# Patient Record
Sex: FEMALE | Race: WHITE | NOT HISPANIC OR LATINO | Employment: FULL TIME | URBAN - NONMETROPOLITAN AREA
[De-identification: names, ages, dates, MRNs, and addresses within clinical notes are randomized per-mention and may not be internally consistent; named-entity substitution may affect disease eponyms.]

---

## 2023-03-17 LAB
AMPHETAMINE (PRESENCE) IN URINE BY SCREEN METHOD: ABNORMAL
BARBITURATES PRESENCE IN URINE BY SCREEN METHOD: ABNORMAL
BENZODIAZEPINE (PRESENCE) IN URINE BY SCREEN METHOD: ABNORMAL
CANNABINOIDS IN URINE BY SCREEN METHOD: ABNORMAL
COCAINE (PRESENCE) IN URINE BY SCREEN METHOD: ABNORMAL
DRUG SCREEN COMMENT URINE: ABNORMAL
FENTANYL URINE: ABNORMAL
METHADONE (PRESENCE) IN URINE BY SCREEN METHOD: ABNORMAL
OPIATES (PRESENCE) IN URINE BY SCREEN METHOD: ABNORMAL
OXYCODONE (PRESENCE) IN URINE BY SCREEN METHOD: ABNORMAL
PHENCYCLIDINE (PRESENCE) IN URINE BY SCREEN METHOD: ABNORMAL

## 2023-03-24 LAB
AMPHETAMINES,URINE: <50 NG/ML
MDA,URINE: <200 NG/ML
MDEA,URINE: <200 NG/ML
MDMA,UR: <200 NG/ML
METHAMPHETAMINE QUANTITATIVE URINE: <200 NG/ML
PHENTERMINE,UR: >5000 NG/ML

## 2023-04-24 ENCOUNTER — HOSPITAL ENCOUNTER (OUTPATIENT)
Dept: DATA CONVERSION | Facility: HOSPITAL | Age: 48
End: 2023-04-24
Attending: ANESTHESIOLOGY | Admitting: ANESTHESIOLOGY
Payer: COMMERCIAL

## 2023-04-24 DIAGNOSIS — M54.16 RADICULOPATHY, LUMBAR REGION: ICD-10-CM

## 2023-04-24 DIAGNOSIS — M47.27 OTHER SPONDYLOSIS WITH RADICULOPATHY, LUMBOSACRAL REGION: ICD-10-CM

## 2023-04-24 DIAGNOSIS — M54.17 RADICULOPATHY, LUMBOSACRAL REGION: ICD-10-CM

## 2023-09-07 VITALS — BODY MASS INDEX: 34.76 KG/M2 | WEIGHT: 172.4 LBS | HEIGHT: 59 IN

## 2023-10-02 NOTE — OP NOTE
Post Operative Note:     PreOp Diagnosis: Lumbosacral radiculopathy   Post-Procedure Diagnosis: Same   Procedure: 1.   2.  Right L4 and L5 transforaminal IRA with fluoroscopy  3.   4.   5.   Surgeon: Dr. Ley   Resident/Fellow/Other Assistant: None   Anesthesia: Local   Estimated Blood Loss (mL): none   Specimen: no   Findings: None     Operative Report Dictated:  Dictation: not applicable - note contains Operative  Report   Operative Report:    47-year-old  female here today for a right L4 and L5 transforaminal IRA with fluoroscopy.  She has a documented bulging disc with nerve root impingement  which has not responded to conservative therapy.  Risk and benefits of today's procedure as well as COVID-19 was discussed with her and a signed consent was obtained prior to entry into the OR as well as surgical site was marked with the surgical marker.  Operation performed:  #1  Right L4 and L5 transforaminal epidural steroid injection with fluoroscopic guidance.   #2 epidurography  ANESTHESIA: Local  ESTIMATED BLOOD LOSS: None  COMPLICATIONS: None  PROCEDURE: A 22-gauge IV was started in the patient's left hand. The patient was taken to the operating room, placed in the prone position where sterile prep and drapes were done. Supplemental oxygen was given to the patient at 2 L per minute. Blood pressure  and pulse ox remained stable throughout the case. Using fluoroscopic guidance, a single 25-gauge 3-1/2 inch spinal needle was inserted into the neural foramen at L4 and L5 on the right.  A cross table lateral identified the needle tip at the vertebral  body. Aspiration was negative of cerebrospinal fluid or blood.  One mL of Omnipaque 300 mg contrast media was injected into each spinal needle. The L4 and L5 nerve roots and epidural space was visualized in both AP and each lateral views with the fluoroscope.  One mL of 2% xylocaine MPF and 6 mg of Celestone was injected into each of the 2 needles. The  needles were then removed and sterile bandage was applied along with Neosporin ointment to the puncture sites. The patient was taken to the recovery room with  no neurologic deficits or pain noted. The patient is scheduled for a follow-up visit.      Electronic Signatures:  Bassam Ley ()  (Signed 24-Apr-2023 12:28)   Authored: Post Operative Note, Note Completion      Last Updated: 24-Apr-2023 12:28 by Bassam Ley ()

## 2024-03-01 ENCOUNTER — APPOINTMENT (OUTPATIENT)
Dept: PRIMARY CARE | Facility: CLINIC | Age: 49
End: 2024-03-01
Payer: COMMERCIAL

## 2024-04-03 ENCOUNTER — APPOINTMENT (OUTPATIENT)
Dept: PRIMARY CARE | Facility: CLINIC | Age: 49
End: 2024-04-03
Payer: COMMERCIAL

## 2024-08-23 ENCOUNTER — APPOINTMENT (OUTPATIENT)
Dept: PRIMARY CARE | Facility: CLINIC | Age: 49
End: 2024-08-23
Payer: COMMERCIAL

## 2024-10-08 ENCOUNTER — OFFICE VISIT (OUTPATIENT)
Dept: URGENT CARE | Facility: URGENT CARE | Age: 49
End: 2024-10-08
Payer: COMMERCIAL

## 2024-10-08 VITALS
WEIGHT: 194 LBS | SYSTOLIC BLOOD PRESSURE: 123 MMHG | OXYGEN SATURATION: 99 % | RESPIRATION RATE: 16 BRPM | BODY MASS INDEX: 39.11 KG/M2 | HEIGHT: 59 IN | DIASTOLIC BLOOD PRESSURE: 85 MMHG | HEART RATE: 73 BPM | TEMPERATURE: 98 F

## 2024-10-08 DIAGNOSIS — R19.7 DIARRHEA, UNSPECIFIED TYPE: ICD-10-CM

## 2024-10-08 DIAGNOSIS — R50.9 FEVER, UNSPECIFIED FEVER CAUSE: Primary | ICD-10-CM

## 2024-10-08 DIAGNOSIS — J06.9 VIRAL URI: ICD-10-CM

## 2024-10-08 DIAGNOSIS — J02.9 SORE THROAT: ICD-10-CM

## 2024-10-08 LAB
POC RAPID INFLUENZA A: NEGATIVE
POC RAPID INFLUENZA B: NEGATIVE
POC RAPID STREP: NEGATIVE
POC SARS-COV-2 AG BINAX: NORMAL
S PYO DNA THROAT QL NAA+PROBE: NOT DETECTED

## 2024-10-08 RX ORDER — BROMPHENIRAMINE MALEATE, PSEUDOEPHEDRINE HYDROCHLORIDE, AND DEXTROMETHORPHAN HYDROBROMIDE 2; 30; 10 MG/5ML; MG/5ML; MG/5ML
5 SYRUP ORAL 4 TIMES DAILY PRN
Qty: 120 ML | Refills: 0 | Status: SHIPPED | OUTPATIENT
Start: 2024-10-08 | End: 2024-10-18

## 2024-10-08 ASSESSMENT — ENCOUNTER SYMPTOMS
FEVER: 0
RHINORRHEA: 1
HEADACHES: 1
CHILLS: 1
GASTROINTESTINAL NEGATIVE: 1
FACIAL SWELLING: 0
SHORTNESS OF BREATH: 0
COUGH: 1
CARDIOVASCULAR NEGATIVE: 1
MYALGIAS: 1

## 2024-10-08 NOTE — LETTER
October 8, 2024     Patient: Tali Moreno   YOB: 1975   Date of Visit: 10/8/2024       To Whom It May Concern:    Tali Moreno was seen in my clinic on 10/8/2024 at 8:00 am. Please excuse Tali for her absence from work on this day to make the appointment.    If you have any questions or concerns, please don't hesitate to call.         Sincerely,         Laura Henley PA-C        CC: No Recipients

## 2024-10-08 NOTE — PROGRESS NOTES
Subjective   Patient ID: Tali Moreno is a 49 y.o. female. They present today with a chief complaint of No chief complaint on file..    History of Present Illness  49-year-old female with no past medical history here with headache cough sore throat and myalgias no fevers positive chills has not tried any over-the-counter medications symptoms started last night    Eating and drinking well          Past Medical History  Allergies as of 10/08/2024    (Not on File)       (Not in a hospital admission)       Past Medical History:   Diagnosis Date    Personal history of other specified conditions 05/02/2022    History of dizziness       Past Surgical History:   Procedure Laterality Date    MR HEAD ANGIO WO IV CONTRAST  5/4/2022    MR HEAD ANGIO WO IV CONTRAST 5/4/2022 [facility]    OTHER SURGICAL HISTORY  05/17/2022    Cholecystectomy    OTHER SURGICAL HISTORY  05/17/2022    Breast surgery            Review of Systems  Review of Systems   Constitutional:  Positive for chills. Negative for fever.   HENT:  Positive for rhinorrhea. Negative for dental problem, drooling, ear discharge and facial swelling.    Respiratory:  Positive for cough. Negative for shortness of breath.    Cardiovascular: Negative.    Gastrointestinal: Negative.    Genitourinary: Negative.    Musculoskeletal:  Positive for myalgias.   Neurological:  Positive for headaches.   All other systems reviewed and are negative.                                 Objective    There were no vitals filed for this visit.  No LMP recorded.    Physical Exam  Vitals and nursing note reviewed.   Constitutional:       Appearance: Normal appearance.   HENT:      Head: Normocephalic and atraumatic.      Right Ear: Tympanic membrane, ear canal and external ear normal.      Left Ear: Tympanic membrane, ear canal and external ear normal.      Nose: Congestion present.      Mouth/Throat:      Mouth: Mucous membranes are moist.      Pharynx: No posterior oropharyngeal  erythema.      Comments: Positive postnasal drip posterior pharynx uvula midline no exudate no edema  Neck:      Vascular: No carotid bruit.      Comments: No cervical lymphadenopathy on exam  Full range of motion  Cardiovascular:      Rate and Rhythm: Regular rhythm. Tachycardia present.   Pulmonary:      Effort: Pulmonary effort is normal.      Breath sounds: Normal breath sounds.   Musculoskeletal:      Cervical back: Normal range of motion and neck supple. No rigidity or tenderness.   Neurological:      Mental Status: She is alert.         Procedures    Point of Care Test & Imaging Results from this visit  No results found for this visit on 10/08/24.   No results found.    Diagnostic study results (if any) were reviewed by Laura Henley PA-C.    Assessment/Plan   Allergies, medications, history, and pertinent labs/EKGs/Imaging reviewed by Laura Henley PA-C.     Medical Decision Making  Differential diagnosis includes strep, COVID, viral URI, seasonal allergies    Rapid strep---> NEGATIVE  Rapid COVID--->NEGATIVE  Rapid flu A & B ---> NEGATIVE  Will send strep PCR treat as viral    Patient instructed take over-the-counter Motrin or Tylenol for symptoms will also prescribe a cough and cold medication  Stressed that if symptoms get worse if they do not improve with the go on longer than 10 days she needs to get rechecked    Orders and Diagnoses  There are no diagnoses linked to this encounter.    Medical Admin Record      Patient disposition: Home    Electronically signed by Laura Henley PA-C  8:14 AM

## 2024-10-08 NOTE — PATIENT INSTRUCTIONS
Push fluids water juices  Take over-the-counter Motrin or Tylenol for the headaches and bodyaches  You are prescribed cough and cold medication to help with the symptoms  If your symptoms get progressively worse if they do not improve after 10 days or if any changes to get rechecked

## 2024-10-14 ENCOUNTER — APPOINTMENT (OUTPATIENT)
Dept: PRIMARY CARE | Facility: CLINIC | Age: 49
End: 2024-10-14
Payer: COMMERCIAL

## 2024-10-14 VITALS
DIASTOLIC BLOOD PRESSURE: 78 MMHG | SYSTOLIC BLOOD PRESSURE: 122 MMHG | WEIGHT: 194 LBS | OXYGEN SATURATION: 97 % | BODY MASS INDEX: 39.18 KG/M2 | HEART RATE: 76 BPM

## 2024-10-14 DIAGNOSIS — Z12.11 COLON CANCER SCREENING: ICD-10-CM

## 2024-10-14 DIAGNOSIS — Z12.31 ENCOUNTER FOR SCREENING MAMMOGRAM FOR MALIGNANT NEOPLASM OF BREAST: ICD-10-CM

## 2024-10-14 DIAGNOSIS — Q28.2 CEREBRAL AV MALFORMATION (HHS-HCC): ICD-10-CM

## 2024-10-14 DIAGNOSIS — F33.0 MILD EPISODE OF RECURRENT MAJOR DEPRESSIVE DISORDER (CMS-HCC): ICD-10-CM

## 2024-10-14 DIAGNOSIS — K21.9 GASTROESOPHAGEAL REFLUX DISEASE WITHOUT ESOPHAGITIS: ICD-10-CM

## 2024-10-14 DIAGNOSIS — R63.5 WEIGHT GAIN: ICD-10-CM

## 2024-10-14 DIAGNOSIS — E61.1 IRON DEFICIENCY: ICD-10-CM

## 2024-10-14 DIAGNOSIS — E55.9 AVITAMINOSIS D: ICD-10-CM

## 2024-10-14 DIAGNOSIS — J01.00 ACUTE NON-RECURRENT MAXILLARY SINUSITIS: ICD-10-CM

## 2024-10-14 DIAGNOSIS — Z00.00 ROUTINE GENERAL MEDICAL EXAMINATION AT A HEALTH CARE FACILITY: Primary | ICD-10-CM

## 2024-10-14 PROCEDURE — 99396 PREV VISIT EST AGE 40-64: CPT | Performed by: FAMILY MEDICINE

## 2024-10-14 PROCEDURE — 99214 OFFICE O/P EST MOD 30 MIN: CPT | Performed by: FAMILY MEDICINE

## 2024-10-14 RX ORDER — ESOMEPRAZOLE MAGNESIUM 40 MG/1
40 CAPSULE, DELAYED RELEASE ORAL
Qty: 90 CAPSULE | Refills: 3 | Status: SHIPPED | OUTPATIENT
Start: 2024-10-14 | End: 2025-10-14

## 2024-10-14 RX ORDER — AZITHROMYCIN 250 MG/1
TABLET, FILM COATED ORAL
Qty: 6 TABLET | Refills: 0 | Status: SHIPPED | OUTPATIENT
Start: 2024-10-14 | End: 2024-10-19

## 2024-10-14 RX ORDER — ESCITALOPRAM OXALATE 10 MG/1
10 TABLET ORAL DAILY
Qty: 90 TABLET | Refills: 3 | Status: SHIPPED | OUTPATIENT
Start: 2024-10-14 | End: 2025-10-14

## 2024-10-14 ASSESSMENT — PROMIS GLOBAL HEALTH SCALE
EMOTIONAL_PROBLEMS: SOMETIMES
RATE_AVERAGE_FATIGUE: MILD
RATE_AVERAGE_PAIN: 3
RATE_PHYSICAL_HEALTH: FAIR
RATE_QUALITY_OF_LIFE: FAIR
CARRYOUT_PHYSICAL_ACTIVITIES: MODERATELY
RATE_SOCIAL_SATISFACTION: FAIR
RATE_GENERAL_HEALTH: FAIR
CARRYOUT_SOCIAL_ACTIVITIES: FAIR
RATE_MENTAL_HEALTH: FAIR

## 2024-10-14 ASSESSMENT — PATIENT HEALTH QUESTIONNAIRE - PHQ9
2. FEELING DOWN, DEPRESSED OR HOPELESS: SEVERAL DAYS
SUM OF ALL RESPONSES TO PHQ9 QUESTIONS 1 AND 2: 2
1. LITTLE INTEREST OR PLEASURE IN DOING THINGS: SEVERAL DAYS
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
## 2024-10-21 ENCOUNTER — LAB (OUTPATIENT)
Dept: LAB | Facility: LAB | Age: 49
End: 2024-10-21
Payer: COMMERCIAL

## 2024-10-21 DIAGNOSIS — E55.9 AVITAMINOSIS D: ICD-10-CM

## 2024-10-21 DIAGNOSIS — E61.1 IRON DEFICIENCY: ICD-10-CM

## 2024-10-21 DIAGNOSIS — Z00.00 ROUTINE GENERAL MEDICAL EXAMINATION AT A HEALTH CARE FACILITY: ICD-10-CM

## 2024-10-21 DIAGNOSIS — R63.5 WEIGHT GAIN: ICD-10-CM

## 2024-10-21 LAB
ALBUMIN SERPL BCP-MCNC: 4.1 G/DL (ref 3.4–5)
ALP SERPL-CCNC: 66 U/L (ref 33–110)
ALT SERPL W P-5'-P-CCNC: 11 U/L (ref 7–45)
ANION GAP SERPL CALC-SCNC: 12 MMOL/L (ref 10–20)
AST SERPL W P-5'-P-CCNC: 17 U/L (ref 9–39)
BASOPHILS # BLD AUTO: 0.03 X10*3/UL (ref 0–0.1)
BASOPHILS NFR BLD AUTO: 0.4 %
BILIRUB SERPL-MCNC: 0.2 MG/DL (ref 0–1.2)
BUN SERPL-MCNC: 16 MG/DL (ref 6–23)
CALCIUM SERPL-MCNC: 8.7 MG/DL (ref 8.6–10.3)
CHLORIDE SERPL-SCNC: 101 MMOL/L (ref 98–107)
CHOLEST SERPL-MCNC: 168 MG/DL (ref 0–199)
CHOLESTEROL/HDL RATIO: 3.7
CO2 SERPL-SCNC: 28 MMOL/L (ref 21–32)
CREAT SERPL-MCNC: 0.79 MG/DL (ref 0.5–1.05)
EGFRCR SERPLBLD CKD-EPI 2021: >90 ML/MIN/1.73M*2
EOSINOPHIL # BLD AUTO: 0.3 X10*3/UL (ref 0–0.7)
EOSINOPHIL NFR BLD AUTO: 3.6 %
ERYTHROCYTE [DISTWIDTH] IN BLOOD BY AUTOMATED COUNT: 13.2 % (ref 11.5–14.5)
GLUCOSE SERPL-MCNC: 104 MG/DL (ref 74–99)
HCT VFR BLD AUTO: 36.4 % (ref 36–46)
HDLC SERPL-MCNC: 45.1 MG/DL
HGB BLD-MCNC: 11.5 G/DL (ref 12–16)
IMM GRANULOCYTES # BLD AUTO: 0.02 X10*3/UL (ref 0–0.7)
IMM GRANULOCYTES NFR BLD AUTO: 0.2 % (ref 0–0.9)
IRON SATN MFR SERPL: 9 % (ref 25–45)
IRON SERPL-MCNC: 33 UG/DL (ref 35–150)
LDLC SERPL CALC-MCNC: 83 MG/DL
LYMPHOCYTES # BLD AUTO: 1.83 X10*3/UL (ref 1.2–4.8)
LYMPHOCYTES NFR BLD AUTO: 22.1 %
MCH RBC QN AUTO: 29.2 PG (ref 26–34)
MCHC RBC AUTO-ENTMCNC: 31.6 G/DL (ref 32–36)
MCV RBC AUTO: 92 FL (ref 80–100)
MONOCYTES # BLD AUTO: 0.62 X10*3/UL (ref 0.1–1)
MONOCYTES NFR BLD AUTO: 7.5 %
NEUTROPHILS # BLD AUTO: 5.49 X10*3/UL (ref 1.2–7.7)
NEUTROPHILS NFR BLD AUTO: 66.2 %
NON HDL CHOLESTEROL: 123 MG/DL (ref 0–149)
NRBC BLD-RTO: 0 /100 WBCS (ref 0–0)
PLATELET # BLD AUTO: 368 X10*3/UL (ref 150–450)
POTASSIUM SERPL-SCNC: 4.1 MMOL/L (ref 3.5–5.3)
PROT SERPL-MCNC: 6.6 G/DL (ref 6.4–8.2)
RBC # BLD AUTO: 3.94 X10*6/UL (ref 4–5.2)
SODIUM SERPL-SCNC: 137 MMOL/L (ref 136–145)
TIBC SERPL-MCNC: 380 UG/DL (ref 240–445)
TRIGL SERPL-MCNC: 199 MG/DL (ref 0–149)
TSH SERPL-ACNC: 3.18 MIU/L (ref 0.44–3.98)
UIBC SERPL-MCNC: 347 UG/DL (ref 110–370)
VLDL: 40 MG/DL (ref 0–40)
WBC # BLD AUTO: 8.3 X10*3/UL (ref 4.4–11.3)

## 2024-10-21 PROCEDURE — 83540 ASSAY OF IRON: CPT

## 2024-10-21 PROCEDURE — 85025 COMPLETE CBC W/AUTO DIFF WBC: CPT

## 2024-10-21 PROCEDURE — 36415 COLL VENOUS BLD VENIPUNCTURE: CPT

## 2024-10-21 PROCEDURE — 82306 VITAMIN D 25 HYDROXY: CPT

## 2024-10-21 PROCEDURE — 83036 HEMOGLOBIN GLYCOSYLATED A1C: CPT

## 2024-10-21 PROCEDURE — 80061 LIPID PANEL: CPT

## 2024-10-21 PROCEDURE — 83550 IRON BINDING TEST: CPT

## 2024-10-21 PROCEDURE — 84443 ASSAY THYROID STIM HORMONE: CPT

## 2024-10-21 PROCEDURE — 80053 COMPREHEN METABOLIC PANEL: CPT

## 2024-10-22 ENCOUNTER — HOSPITAL ENCOUNTER (OUTPATIENT)
Dept: RADIOLOGY | Facility: HOSPITAL | Age: 49
Discharge: HOME | End: 2024-10-22
Payer: COMMERCIAL

## 2024-10-22 VITALS — WEIGHT: 190 LBS | HEIGHT: 59 IN | BODY MASS INDEX: 38.3 KG/M2

## 2024-10-22 DIAGNOSIS — Z12.31 ENCOUNTER FOR SCREENING MAMMOGRAM FOR MALIGNANT NEOPLASM OF BREAST: ICD-10-CM

## 2024-10-22 LAB
25(OH)D3 SERPL-MCNC: 35 NG/ML (ref 30–100)
EST. AVERAGE GLUCOSE BLD GHB EST-MCNC: 103 MG/DL
HBA1C MFR BLD: 5.2 %

## 2024-10-22 PROCEDURE — 77067 SCR MAMMO BI INCL CAD: CPT | Performed by: RADIOLOGY

## 2024-10-22 PROCEDURE — 77067 SCR MAMMO BI INCL CAD: CPT

## 2024-10-22 PROCEDURE — 77063 BREAST TOMOSYNTHESIS BI: CPT | Performed by: RADIOLOGY

## 2024-10-24 DIAGNOSIS — E61.1 IRON DEFICIENCY: Primary | ICD-10-CM

## 2024-10-24 RX ORDER — FERROUS SULFATE 325(65) MG
325 TABLET, DELAYED RELEASE (ENTERIC COATED) ORAL DAILY
Qty: 90 TABLET | Refills: 3 | Status: SHIPPED | OUTPATIENT
Start: 2024-10-24 | End: 2025-10-24

## 2024-11-07 LAB — NONINV COLON CA DNA+OCC BLD SCRN STL QL: NEGATIVE

## 2024-11-12 ENCOUNTER — APPOINTMENT (OUTPATIENT)
Dept: RADIOLOGY | Facility: HOSPITAL | Age: 49
End: 2024-11-12
Payer: COMMERCIAL

## 2024-11-12 ENCOUNTER — HOSPITAL ENCOUNTER (OUTPATIENT)
Dept: RADIOLOGY | Facility: HOSPITAL | Age: 49
Discharge: HOME | End: 2024-11-12
Payer: COMMERCIAL

## 2024-11-12 DIAGNOSIS — R92.8 ABNORMAL MAMMOGRAM: ICD-10-CM

## 2024-11-12 PROCEDURE — 77065 DX MAMMO INCL CAD UNI: CPT | Mod: LEFT SIDE | Performed by: RADIOLOGY

## 2024-11-12 PROCEDURE — 77061 BREAST TOMOSYNTHESIS UNI: CPT | Mod: LEFT SIDE | Performed by: RADIOLOGY

## 2024-11-12 PROCEDURE — 77061 BREAST TOMOSYNTHESIS UNI: CPT | Mod: LT

## 2024-11-19 ENCOUNTER — TELEPHONE (OUTPATIENT)
Dept: PRIMARY CARE | Facility: CLINIC | Age: 49
End: 2024-11-19
Payer: COMMERCIAL

## 2024-12-16 ENCOUNTER — APPOINTMENT (OUTPATIENT)
Dept: PRIMARY CARE | Facility: CLINIC | Age: 49
End: 2024-12-16
Payer: COMMERCIAL

## 2025-04-28 ENCOUNTER — APPOINTMENT (OUTPATIENT)
Dept: PRIMARY CARE | Facility: CLINIC | Age: 50
End: 2025-04-28
Payer: COMMERCIAL

## 2025-04-29 ENCOUNTER — APPOINTMENT (OUTPATIENT)
Dept: PRIMARY CARE | Facility: CLINIC | Age: 50
End: 2025-04-29
Payer: COMMERCIAL

## 2025-04-29 VITALS
SYSTOLIC BLOOD PRESSURE: 118 MMHG | HEART RATE: 74 BPM | DIASTOLIC BLOOD PRESSURE: 74 MMHG | BODY MASS INDEX: 39.79 KG/M2 | OXYGEN SATURATION: 98 % | WEIGHT: 197 LBS

## 2025-04-29 DIAGNOSIS — Q28.3 CEREBRAL CAVERNOMA (HHS-HCC): ICD-10-CM

## 2025-04-29 DIAGNOSIS — Z79.899 HIGH RISK MEDICATION USE: ICD-10-CM

## 2025-04-29 DIAGNOSIS — R63.5 WEIGHT GAIN: Primary | ICD-10-CM

## 2025-04-29 DIAGNOSIS — E66.09 CLASS 2 OBESITY DUE TO EXCESS CALORIES WITHOUT SERIOUS COMORBIDITY WITH BODY MASS INDEX (BMI) OF 39.0 TO 39.9 IN ADULT: ICD-10-CM

## 2025-04-29 DIAGNOSIS — E66.812 CLASS 2 OBESITY DUE TO EXCESS CALORIES WITHOUT SERIOUS COMORBIDITY WITH BODY MASS INDEX (BMI) OF 39.0 TO 39.9 IN ADULT: ICD-10-CM

## 2025-04-29 DIAGNOSIS — Q28.2 CEREBRAL AV MALFORMATION (HHS-HCC): ICD-10-CM

## 2025-04-29 DIAGNOSIS — E61.1 IRON DEFICIENCY: ICD-10-CM

## 2025-04-29 PROCEDURE — 99214 OFFICE O/P EST MOD 30 MIN: CPT | Performed by: FAMILY MEDICINE

## 2025-04-29 RX ORDER — PHENTERMINE HYDROCHLORIDE 37.5 MG/1
37.5 TABLET ORAL
Qty: 30 TABLET | Refills: 0 | Status: SHIPPED | OUTPATIENT
Start: 2025-04-29 | End: 2025-05-29

## 2025-04-29 ASSESSMENT — PATIENT HEALTH QUESTIONNAIRE - PHQ9
SUM OF ALL RESPONSES TO PHQ9 QUESTIONS 1 AND 2: 0
1. LITTLE INTEREST OR PLEASURE IN DOING THINGS: NOT AT ALL
2. FEELING DOWN, DEPRESSED OR HOPELESS: NOT AT ALL

## 2025-04-29 NOTE — PROGRESS NOTES
Subjective   Tali Moreno is a 49 y.o. female who presents for Menopause (Patient's weight keeps increasing - not sure if going through menopause).    HPI    #) anxiety/depression - had taken medications in the past    stable, good as when taking the medications  - stopped the lexapro, does not think she needs it.  - energy is good.      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   - worse with walking or standing      #) left CMC joint swelling and pain -- likely ganglion cysts -- did see ortho hand   - woke one AM and was there   - started in July   - xray neg for fx      #) Obesity - stable   - BMI 38.7--> 32.72---> 37.8 --> 39.8  --- wt today was 174 --> 194 --> 197   - tried phentermine in the past and lost 20# `but made her have headaches and dry mouth - she is willing to try again due to cost  - tried semaglutide samples for 5 weeks and did not seem to help   - she would like to try the Ozempic, we discussed that that is covered for T2DM   -  does report that she snores  - is walking a lot, on a treadmill , one hour per day   - is trying to cut out sweets, mostly salads with chicken and fish   - no nocturia   - chronic fatigue   - father had a MI at 51, passed, he was a smoker and drinker     #) Varicose veins - working on treatment   - will refer to vascular - did the consultation, 4 procedures are scheduled - better   - cramping legs - better      #) GERD with abdominal pain - stable , taking over the counter nexium   - CT abd showed small fat containing ventral hernia  - will refer to general surgery once feeling better      #) Preventive:  Smoker: 1 cig per week, not interested in quitting   Etoh: 6 drinks per week   BP: 118/74  Fam h/o: pancreatic and lung cancer   PAP: 1/27/17, Dr Hair  Mammogram: needs, have been abnormal in the past at [facility]; order given last visit   Colonoscopy: never    DEXA: n/a  Fasting blood work:10/14/17   Last eye exam: 2017  Last dental Exam: needs   2 children      ROS was completed and all systems are negative with the exception of what was noted in the the HPI.     Objective     /74   Pulse 74   Wt 89.4 kg (197 lb)   SpO2 98%   BMI 39.79 kg/m²      Physical Exam  GEN: A+O, no acute distress  HEENT: NC/AT, Oropharynx clear, no exudates, TM visualized, Extraoccular muscles intact, no facial droop; no thyromegaly or cervical LAD  RESP: CTAB, no wheezes   CV: RRR, no murmurs  ABD: soft, non-tender, + BS  SKIN: no rashes or bruising, no peripheral edema   NEURO: CN II-XII grossly intact, moves all extremities equally, no tremor   PSYCH: normal affect, appropriate mood     Assessment/Plan   Problem List Items Addressed This Visit    None  Please get updated fasting blood work     Start the phentermine in the AM   We discussed side effects  Start with half and then increase to a full tablet as tolerated   Make sure to drink around 60 oz of water per day  Shoot for about 100 grams of protein per day     Work on some weighted exercise.     Blood pressure is good today at 118/74     Glad you mood has been better     Continue to work on good sleep.     Mammogram was good on 10/22/24, with additional imaging. Repeat in October 2025.     Cologuard was good in October 2024. Due again in October 2027    Follow up in roughly 1 month for weight check and refills or sooner as needed     Shirley Lemus DO, MSMed, ABOM  [address]   [address]   [place] [address]  Ph. [phone]  Fx. [phone]

## 2025-04-29 NOTE — PATIENT INSTRUCTIONS
Please get updated fasting blood work     Start the phentermine in the AM   We discussed side effects  Start with half and then increase to a full tablet as tolerated   Make sure to drink around 60 oz of water per day  Shoot for about 100 grams of protein per day     Work on some weighted exercise.     Blood pressure is good today at 118/74     Glad you mood has been better     Continue to work on good sleep.     Mammogram was good on 10/22/24, with additional imaging. Repeat in October 2025.     Cologuard was good in October 2024. Due again in October 2027    Follow up in roughly 1 month for weight check and refills or sooner as needed

## 2025-04-30 LAB
AMPHETAMINES UR QL: NEGATIVE NG/ML
BARBITURATES UR QL: NEGATIVE NG/ML
BENZODIAZ UR QL: NEGATIVE NG/ML
BZE UR QL: NEGATIVE NG/ML
CREAT UR-MCNC: 126.4 MG/DL
EST. AVERAGE GLUCOSE BLD GHB EST-MCNC: 108 MG/DL
EST. AVERAGE GLUCOSE BLD GHB EST-SCNC: 6 MMOL/L
FENTANYL UR QL SCN: NEGATIVE NG/ML
FSH SERPL-ACNC: 30.1 MIU/ML
HBA1C MFR BLD: 5.4 %
IRON SATN MFR SERPL: 10 % (CALC) (ref 16–45)
IRON SERPL-MCNC: 42 MCG/DL (ref 40–190)
LH SERPL-ACNC: 17.5 MIU/ML
METHADONE UR QL: NEGATIVE NG/ML
OPIATES UR QL: NEGATIVE NG/ML
OXIDANTS UR QL: NEGATIVE MCG/ML
OXYCODONE UR QL: NEGATIVE NG/ML
PCP UR QL: NEGATIVE NG/ML
PH UR: 6.6 [PH] (ref 4.5–9)
QUEST NOTES AND COMMENTS: NORMAL
THC UR QL: NEGATIVE NG/ML
TIBC SERPL-MCNC: 403 MCG/DL (CALC) (ref 250–450)
TSH SERPL-ACNC: 3.56 MIU/L

## 2025-06-02 ENCOUNTER — APPOINTMENT (OUTPATIENT)
Dept: PRIMARY CARE | Facility: CLINIC | Age: 50
End: 2025-06-02
Payer: COMMERCIAL

## 2025-06-02 VITALS
SYSTOLIC BLOOD PRESSURE: 122 MMHG | WEIGHT: 192 LBS | OXYGEN SATURATION: 92 % | BODY MASS INDEX: 38.78 KG/M2 | HEART RATE: 78 BPM | DIASTOLIC BLOOD PRESSURE: 74 MMHG

## 2025-06-02 DIAGNOSIS — E66.09 CLASS 2 OBESITY DUE TO EXCESS CALORIES WITHOUT SERIOUS COMORBIDITY WITH BODY MASS INDEX (BMI) OF 39.0 TO 39.9 IN ADULT: ICD-10-CM

## 2025-06-02 DIAGNOSIS — E66.812 CLASS 2 OBESITY DUE TO EXCESS CALORIES WITHOUT SERIOUS COMORBIDITY WITH BODY MASS INDEX (BMI) OF 39.0 TO 39.9 IN ADULT: ICD-10-CM

## 2025-06-02 DIAGNOSIS — R51.9 DAILY HEADACHE: Primary | ICD-10-CM

## 2025-06-02 PROCEDURE — 99214 OFFICE O/P EST MOD 30 MIN: CPT | Performed by: FAMILY MEDICINE

## 2025-06-02 RX ORDER — PHENTERMINE HYDROCHLORIDE 37.5 MG/1
37.5 TABLET ORAL
Qty: 30 TABLET | Refills: 0 | Status: SHIPPED | OUTPATIENT
Start: 2025-06-02 | End: 2025-07-02

## 2025-06-02 RX ORDER — TOPIRAMATE 50 MG/1
50 TABLET, FILM COATED ORAL NIGHTLY
Qty: 90 TABLET | Refills: 0 | Status: SHIPPED | OUTPATIENT
Start: 2025-06-02 | End: 2025-11-29

## 2025-06-02 ASSESSMENT — PATIENT HEALTH QUESTIONNAIRE - PHQ9
2. FEELING DOWN, DEPRESSED OR HOPELESS: NOT AT ALL
1. LITTLE INTEREST OR PLEASURE IN DOING THINGS: NOT AT ALL
SUM OF ALL RESPONSES TO PHQ9 QUESTIONS 1 AND 2: 0

## 2025-06-02 NOTE — PROGRESS NOTES
Subjective   Tali Moreno is a 49 y.o. female who presents for Follow-up (One month follow up).    HPI    #) anxiety/depression - had taken medications in the past - stable    stable, good as when taking the medications  - stopped the lexapro, does not think she needs it.  - energy is good.      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   - worse with walking or standing      #) left CMC joint swelling and pain -- likely ganglion cysts -- did see ortho hand   - woke one AM and was there   - started in July   - xray neg for fx      #) Obesity - stable   - BMI 38.7--> 32.72---> 37.8 --> 39.8 --> 38.8   --- wt today was 174 --> 194 --> 197 (started the phentermine)--> 192   - down 5#   - tried phentermine in the past and lost 20# `but made her have headaches and dry mouth - she is willing to try again due to cost  - tried semaglutide samples for 5 weeks and did not seem to help   - she would like to try the Ozempic, we discussed that that is covered for T2DM   -  does report that she snores  - is walking a lot, on a treadmill , one hour per day   - is trying to cut out sweets, mostly salads with chicken and fish   - no nocturia   - chronic fatigue -- better   - father had a MI at 51, passed, he was a smoker and drinker     #) Varicose veins - working on treatment   - will refer to vascular - did the consultation, 4 procedures are scheduled - better   - cramping legs - better      #) GERD with abdominal pain - stable , taking over the counter nexium   - CT abd showed small fat containing ventral hernia  - will refer to general surgery once feeling better      #) Preventive:  Smoker: 1 cig per week, not interested in quitting   Etoh: 6 drinks per week   Fam h/o: pancreatic and lung cancer   PAP: 1/27/17, Dr Hair  Mammogram: needs, have been abnormal in the past at [facility]; order given last visit    Colonoscopy: never   DEXA: n/a  Fasting blood work:10/14/17   Last eye exam: 2017  Last dental Exam: needs   2 children      ROS was completed and all systems are negative with the exception of what was noted in the the HPI.     Objective     /74   Pulse 78   Wt 87.1 kg (192 lb)   SpO2 92%   BMI 38.78 kg/m²      Physical Exam  GEN: A+O, no acute distress  HEENT: NC/AT, Oropharynx clear, no exudates, TM visualized, Extraoccular muscles intact, no facial droop; no thyromegaly or cervical LAD  RESP: CTAB, no wheezes   CV: RRR, no murmurs  ABD: soft, non-tender, + BS  SKIN: no rashes or bruising, no peripheral edema   NEURO: CN II-XII grossly intact, moves all extremities equally, no tremor   PSYCH: normal affect, appropriate mood     Assessment/Plan   Problem List Items Addressed This Visit    None  Labs on 4/29/25- Iron is on the low side, please try to increase iron in the diet and maybe take an OTC iron supplement for a month. A1c is good at 5.4% . Hormones would correlate to being postmenopausal, however in perimenopause these hormones can fluctuate . Normal thyroid level.     continue the phentermine in the AM   You care down 5# already   Add on the topamax 50 mg at bedtime.   We discussed side effects    Make sure to drink around 60 oz of water per day  Shoot for about 100 grams of protein per day     Work on some weighted exercise.     Blood pressure is good today at 122/74     Glad you mood has been better     Continue to work on good sleep.     Mammogram was good on 10/22/24, with additional imaging. Repeat in October 2025.     Cologuard was good in October 2024. Due again in October 2027    Follow up in roughly 1 month for weight check and refills or sooner as needed     Shirley Lemus, DO, MSMed, ABOM  [address]   [address]   [place] [address]  Ph. [phone]  Fx. [phone]

## 2025-06-02 NOTE — PATIENT INSTRUCTIONS
Labs on 4/29/25- Iron is on the low side, please try to increase iron in the diet and maybe take an OTC iron supplement for a month. A1c is good at 5.4% . Hormones would correlate to being postmenopausal, however in perimenopause these hormones can fluctuate . Normal thyroid level.     continue the phentermine in the AM   You care down 5# already   Add on the topamax 50 mg at bedtime.   We discussed side effects    Make sure to drink around 60 oz of water per day  Shoot for about 100 grams of protein per day     Work on some weighted exercise.     Blood pressure is good today at 122/74     Glad you mood has been better     Continue to work on good sleep.     Mammogram was good on 10/22/24, with additional imaging. Repeat in October 2025.     Cologuard was good in October 2024. Due again in October 2027    Follow up in roughly 1 month for weight check and refills or sooner as needed

## 2025-07-01 ENCOUNTER — APPOINTMENT (OUTPATIENT)
Dept: PRIMARY CARE | Facility: CLINIC | Age: 50
End: 2025-07-01
Payer: COMMERCIAL

## 2025-07-07 ENCOUNTER — APPOINTMENT (OUTPATIENT)
Dept: PRIMARY CARE | Facility: CLINIC | Age: 50
End: 2025-07-07
Payer: COMMERCIAL

## 2025-07-07 VITALS
SYSTOLIC BLOOD PRESSURE: 122 MMHG | WEIGHT: 189 LBS | OXYGEN SATURATION: 96 % | BODY MASS INDEX: 38.17 KG/M2 | HEART RATE: 72 BPM | DIASTOLIC BLOOD PRESSURE: 70 MMHG

## 2025-07-07 DIAGNOSIS — B35.3 TINEA PEDIS OF LEFT FOOT: Primary | ICD-10-CM

## 2025-07-07 DIAGNOSIS — E66.09 CLASS 2 OBESITY DUE TO EXCESS CALORIES WITHOUT SERIOUS COMORBIDITY WITH BODY MASS INDEX (BMI) OF 39.0 TO 39.9 IN ADULT: ICD-10-CM

## 2025-07-07 DIAGNOSIS — F33.0 MILD EPISODE OF RECURRENT MAJOR DEPRESSIVE DISORDER: ICD-10-CM

## 2025-07-07 DIAGNOSIS — E61.1 IRON DEFICIENCY: ICD-10-CM

## 2025-07-07 DIAGNOSIS — E66.812 CLASS 2 OBESITY DUE TO EXCESS CALORIES WITHOUT SERIOUS COMORBIDITY WITH BODY MASS INDEX (BMI) OF 39.0 TO 39.9 IN ADULT: ICD-10-CM

## 2025-07-07 DIAGNOSIS — K21.9 GASTROESOPHAGEAL REFLUX DISEASE WITHOUT ESOPHAGITIS: ICD-10-CM

## 2025-07-07 DIAGNOSIS — R51.9 DAILY HEADACHE: ICD-10-CM

## 2025-07-07 PROCEDURE — 99214 OFFICE O/P EST MOD 30 MIN: CPT | Performed by: FAMILY MEDICINE

## 2025-07-07 RX ORDER — ESCITALOPRAM OXALATE 10 MG/1
10 TABLET ORAL DAILY
Qty: 90 TABLET | Refills: 3 | Status: SHIPPED | OUTPATIENT
Start: 2025-07-07 | End: 2026-07-07

## 2025-07-07 RX ORDER — FERROUS SULFATE 325(65) MG
325 TABLET, DELAYED RELEASE (ENTERIC COATED) ORAL DAILY
Qty: 90 TABLET | Refills: 3 | Status: SHIPPED | OUTPATIENT
Start: 2025-07-07 | End: 2026-07-07

## 2025-07-07 RX ORDER — ESOMEPRAZOLE MAGNESIUM 40 MG/1
40 CAPSULE, DELAYED RELEASE ORAL
Qty: 90 CAPSULE | Refills: 3 | Status: SHIPPED | OUTPATIENT
Start: 2025-07-07 | End: 2026-07-07

## 2025-07-07 RX ORDER — PHENTERMINE HYDROCHLORIDE 37.5 MG/1
37.5 TABLET ORAL
Qty: 30 TABLET | Refills: 0 | Status: SHIPPED | OUTPATIENT
Start: 2025-07-07 | End: 2025-08-06

## 2025-07-07 RX ORDER — TOPIRAMATE 50 MG/1
50 TABLET, FILM COATED ORAL NIGHTLY
Qty: 90 TABLET | Refills: 3 | Status: SHIPPED | OUTPATIENT
Start: 2025-07-07 | End: 2026-07-02

## 2025-07-07 RX ORDER — CLOTRIMAZOLE AND BETAMETHASONE DIPROPIONATE 10; .64 MG/G; MG/G
1 CREAM TOPICAL 2 TIMES DAILY
Qty: 45 G | Refills: 0 | Status: SHIPPED | OUTPATIENT
Start: 2025-07-07 | End: 2025-08-04

## 2025-07-07 ASSESSMENT — PATIENT HEALTH QUESTIONNAIRE - PHQ9
1. LITTLE INTEREST OR PLEASURE IN DOING THINGS: NOT AT ALL
2. FEELING DOWN, DEPRESSED OR HOPELESS: NOT AT ALL
SUM OF ALL RESPONSES TO PHQ9 QUESTIONS 1 AND 2: 0

## 2025-07-07 NOTE — PROGRESS NOTES
Subjective   Tali Moreno is a 49 y.o. female who presents for Follow-up (One month follow up).    HPI    #) anxiety/depression - had taken medications in the past - stable    stable, good as when taking the medications  - stopped the lexapro, does not think she needs it.  - energy is good.      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   - worse with walking or standing      #) left CMC joint swelling and pain -- likely ganglion cysts -- did see ortho hand   - woke one AM and was there   - started in July   - xray neg for fx      #) Obesity - stable   - BMI 38.7--> 32.72---> 37.8 --> 39.8 --> 38.8 --> 38.17   --- wt today was 174 --> 194 --> 197 (started the phentermine)--> 192  --> 189   - down 8# in 60 days   - tried phentermine in the past and lost 20# `but made her have headaches and dry mouth - she is willing to try again due to cost  - no insomnia or constipation   - tried semaglutide samples for 5 weeks and did not seem to help   - she would like to try the Ozempic, we discussed that that is covered for T2DM   -  does report that she snores  - is walking a lot, on a treadmill , one hour per day   - is trying to cut out sweets, mostly salads with chicken and fish   - no nocturia   - chronic fatigue -- better   - father had a MI at 51, passed, he was a smoker and drinker     #) Varicose veins - working on treatment   - will refer to vascular - did the consultation, 4 procedures are scheduled - better   - cramping legs - better      #) GERD with abdominal pain - stable , taking over the counter nexium   - CT abd showed small fat containing ventral hernia  - will refer to general surgery once feeling better      #) Preventive:  Smoker: 1 cig per week, not interested in quitting   Etoh: 6 drinks per week   Fam h/o: pancreatic and lung cancer   PAP: 1/27/17, Dr Hair  Mammogram: needs, have been  abnormal in the past at [facility]; order given last visit   Colonoscopy: never   DEXA: n/a  Fasting blood work:10/14/17   Last eye exam: 2017  Last dental Exam: needs   2 children      ROS was completed and all systems are negative with the exception of what was noted in the the HPI.     Objective     /70   Pulse 72   Wt 85.7 kg (189 lb)   SpO2 96%   BMI 38.17 kg/m²      Physical Exam  GEN: A+O, no acute distress  HEENT: NC/AT, Oropharynx clear, no exudates, TM visualized, Extraoccular muscles intact, no facial droop; no thyromegaly or cervical LAD  RESP: CTAB, no wheezes   CV: RRR, no murmurs  ABD: soft, non-tender, + BS  SKIN: no rashes or bruising, no peripheral edema   NEURO: CN II-XII grossly intact, moves all extremities equally, no tremor   PSYCH: normal affect, appropriate mood     Assessment/Plan   Problem List Items Addressed This Visit    None    Please try the Lotrisone cream for the left foot rash     Labs on 4/29/25- Iron is on the low side, please try to increase iron in the diet and maybe take an OTC iron supplement for a month. A1c is good at 5.4% . Hormones would correlate to being postmenopausal, however in perimenopause these hormones can fluctuate . Normal thyroid level.     continue the phentermine in the AM   You care down 8# in 2 months   With the topamax 50 mg at bedtime.     Make sure to drink around 60 oz of water per day  Shoot for about 100 grams of protein per day     Work on some weighted exercise.     Blood pressure is good today at 122/70     Glad you mood has been better     Continue to work on good sleep.     Mammogram was good on 10/22/24, with additional imaging. Repeat in October 2025.     Cologuard was good in October 2024. Due again in October 2027    Follow up in roughly 1 month for weight check and refills or sooner as needed     Shirley Lemus, DO, MSMed, ABOM  [address]   [address]   [place] [address]  Ph. [phone]  Fx. [phone]

## 2025-07-07 NOTE — PATIENT INSTRUCTIONS
Please try the Lotrisone cream for the left foot rash     Labs on 4/29/25- Iron is on the low side, please try to increase iron in the diet and maybe take an OTC iron supplement for a month. A1c is good at 5.4% . Hormones would correlate to being postmenopausal, however in perimenopause these hormones can fluctuate . Normal thyroid level.     continue the phentermine in the AM   You care down 8# in 2 months   With the topamax 50 mg at bedtime.     Make sure to drink around 60 oz of water per day  Shoot for about 100 grams of protein per day     Work on some weighted exercise.     Blood pressure is good today at 122/70     Glad you mood has been better     Continue to work on good sleep.     Mammogram was good on 10/22/24, with additional imaging. Repeat in October 2025.     Cologuard was good in October 2024. Due again in October 2027    Follow up in roughly 1 month for weight check and refills or sooner as needed

## 2025-08-04 ENCOUNTER — APPOINTMENT (OUTPATIENT)
Dept: PRIMARY CARE | Facility: CLINIC | Age: 50
End: 2025-08-04
Payer: COMMERCIAL

## 2025-08-04 VITALS
DIASTOLIC BLOOD PRESSURE: 70 MMHG | BODY MASS INDEX: 38.17 KG/M2 | SYSTOLIC BLOOD PRESSURE: 124 MMHG | HEART RATE: 76 BPM | OXYGEN SATURATION: 97 % | WEIGHT: 189 LBS

## 2025-08-04 DIAGNOSIS — E66.09 CLASS 2 OBESITY DUE TO EXCESS CALORIES WITHOUT SERIOUS COMORBIDITY WITH BODY MASS INDEX (BMI) OF 39.0 TO 39.9 IN ADULT: ICD-10-CM

## 2025-08-04 DIAGNOSIS — B35.3 TINEA PEDIS OF LEFT FOOT: ICD-10-CM

## 2025-08-04 DIAGNOSIS — E66.812 CLASS 2 OBESITY DUE TO EXCESS CALORIES WITHOUT SERIOUS COMORBIDITY WITH BODY MASS INDEX (BMI) OF 39.0 TO 39.9 IN ADULT: ICD-10-CM

## 2025-08-04 PROCEDURE — 99213 OFFICE O/P EST LOW 20 MIN: CPT | Performed by: FAMILY MEDICINE

## 2025-08-04 RX ORDER — PHENTERMINE HYDROCHLORIDE 37.5 MG/1
37.5 TABLET ORAL
Qty: 30 TABLET | Refills: 0 | Status: SHIPPED | OUTPATIENT
Start: 2025-08-04 | End: 2025-09-03

## 2025-08-04 RX ORDER — CLOTRIMAZOLE AND BETAMETHASONE DIPROPIONATE 10; .64 MG/G; MG/G
1 CREAM TOPICAL 2 TIMES DAILY
Qty: 45 G | Refills: 0 | Status: SHIPPED | OUTPATIENT
Start: 2025-08-04 | End: 2025-09-01

## 2025-08-04 ASSESSMENT — PATIENT HEALTH QUESTIONNAIRE - PHQ9
1. LITTLE INTEREST OR PLEASURE IN DOING THINGS: NOT AT ALL
SUM OF ALL RESPONSES TO PHQ9 QUESTIONS 1 AND 2: 0
2. FEELING DOWN, DEPRESSED OR HOPELESS: NOT AT ALL

## 2025-08-04 NOTE — PROGRESS NOTES
Subjective   Tali Moreno is a 50 y.o. female who presents for Follow-up (One month follow up).    HPI    #) anxiety/depression - had taken medications in the past - stable    stable, good as when taking the medications  - stopped the lexapro, does not think she needs it.  - energy is good.      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   - worse with walking or standing      #) left CMC joint swelling and pain -- likely ganglion cysts -- did see ortho hand   - woke one AM and was there   - started in July   - xray neg for fx      #) Obesity - stable   - BMI 38.7--> 32.72---> 37.8 --> 39.8 --> 38.8 --> 38.17   --- wt today was 174 --> 194 --> 197 (started the phentermine)--> 192  --> 189 --> stable 189  - tried phentermine in the past and lost 20# `but made her have headaches and dry mouth - she is willing to try again due to cost  - no insomnia or constipation   - [facility] reviewed, last filled on 7/7/25 , completed 3 months , down 4% TBW  - tried semaglutide samples for 5 weeks and did not seem to help   - she would like to try the Ozempic, we discussed that that is covered for T2DM   -  does report that she snores  - is walking a lot, on a treadmill , one hour per day   - is trying to cut out sweets, mostly salads with chicken and fish   - no nocturia   - chronic fatigue -- better   - father had a MI at 51, passed, he was a smoker and drinker     #) Varicose veins - working on treatment   - will refer to vascular - did the consultation, 4 procedures are scheduled - better   - cramping legs - better      #) GERD with abdominal pain - stable , taking over the counter nexium   - CT abd showed small fat containing ventral hernia  - will refer to general surgery once feeling better      #) Preventive:  Smoker: 1 cig per week, not interested in quitting   Etoh: 6 drinks per week   Fam h/o: pancreatic  and lung cancer   PAP: 1/27/17, Dr Hair  Mammogram: needs, have been abnormal in the past at [facility]; order given last visit   Colonoscopy: never   DEXA: n/a  Fasting blood work:10/14/17   Last eye exam: 2017  Last dental Exam: needs   2 children      ROS was completed and all systems are negative with the exception of what was noted in the the HPI.     Objective     /70   Pulse 76   Wt 85.7 kg (189 lb)   SpO2 97%   BMI 38.17 kg/m²      Physical Exam  GEN: A+O, no acute distress  HEENT: NC/AT, Oropharynx clear, no exudates, TM visualized, Extraoccular muscles intact, no facial droop; no thyromegaly or cervical LAD  RESP: CTAB, no wheezes   CV: RRR, no murmurs  ABD: soft, non-tender, + BS  SKIN: no rashes or bruising, no peripheral edema   NEURO: CN II-XII grossly intact, moves all extremities equally, no tremor   PSYCH: normal affect, appropriate mood     Assessment/Plan   Problem List Items Addressed This Visit    None  Please continue the Lotrisone cream for the left foot rash     Labs on 4/29/25- Iron is on the low side, please try to increase iron in the diet and maybe take an OTC iron supplement for a month. A1c is good at 5.4% . Hormones would correlate to being postmenopausal, however in perimenopause these hormones can fluctuate . Normal thyroid level.     continue the phentermine in the AM   You care down 8# in 3 months   With the topamax 50 mg at bedtime. --> increase to 100 mg at bedtime     Samples of semaglutide to add with it at 0.25 mg weekly dose.     Make sure to drink around 60 oz of water per day  Shoot for about 100 grams of protein per day     Work on some weighted exercise.     Blood pressure is good today at 124/70     Glad you mood has been better     Continue to work on good sleep.     Mammogram was good on 10/22/24, with additional imaging. Repeat in October 2025.     Cologuard was good in October 2024. Due again in October 2027    Follow up in roughly 1 month for weight check and  refills or sooner as needed     Shirley Lemus DO, [facility], [facility]  [address]   [address]   [place] [address]  Ph. [phone]  Fx. [phone]

## 2025-08-04 NOTE — PATIENT INSTRUCTIONS
Please continue the Lotrisone cream for the left foot rash     Labs on 4/29/25- Iron is on the low side, please try to increase iron in the diet and maybe take an OTC iron supplement for a month. A1c is good at 5.4% . Hormones would correlate to being postmenopausal, however in perimenopause these hormones can fluctuate . Normal thyroid level.     continue the phentermine in the AM   You care down 8# in 3 months   With the topamax 50 mg at bedtime. --> increase to 100 mg at bedtime     Samples of semaglutide to add with it at 0.25 mg weekly dose.     Make sure to drink around 60 oz of water per day  Shoot for about 100 grams of protein per day     Work on some weighted exercise.     Blood pressure is good today at 124/70     Glad you mood has been better     Continue to work on good sleep.     Mammogram was good on 10/22/24, with additional imaging. Repeat in October 2025.     Cologuard was good in October 2024. Due again in October 2027    Follow up in roughly 1 month for weight check and refills or sooner as needed

## 2025-09-09 ENCOUNTER — APPOINTMENT (OUTPATIENT)
Dept: PRIMARY CARE | Facility: CLINIC | Age: 50
End: 2025-09-09
Payer: COMMERCIAL